# Patient Record
Sex: MALE | Race: WHITE
[De-identification: names, ages, dates, MRNs, and addresses within clinical notes are randomized per-mention and may not be internally consistent; named-entity substitution may affect disease eponyms.]

---

## 2019-06-27 ENCOUNTER — HOSPITAL ENCOUNTER (EMERGENCY)
Dept: HOSPITAL 91 - FTE | Age: 34
Discharge: HOME | End: 2019-06-27
Payer: MEDICAID

## 2019-06-27 ENCOUNTER — HOSPITAL ENCOUNTER (EMERGENCY)
Dept: HOSPITAL 10 - FTE | Age: 34
Discharge: HOME | End: 2019-06-27
Payer: MEDICAID

## 2019-06-27 VITALS
WEIGHT: 169.76 LBS | BODY MASS INDEX: 25.73 KG/M2 | HEIGHT: 68 IN | WEIGHT: 169.76 LBS | HEIGHT: 68 IN | BODY MASS INDEX: 25.73 KG/M2

## 2019-06-27 VITALS — SYSTOLIC BLOOD PRESSURE: 129 MMHG | DIASTOLIC BLOOD PRESSURE: 83 MMHG | HEART RATE: 74 BPM | RESPIRATION RATE: 20 BRPM

## 2019-06-27 DIAGNOSIS — K52.9: Primary | ICD-10-CM

## 2019-06-27 LAB
URINE BLOOD (DIP) POC: (no result)
URINE PH (DIP) POC: 6 (ref 5–8.5)
URINE TOTAL PROTEIN POC: (no result)

## 2019-06-27 PROCEDURE — 99283 EMERGENCY DEPT VISIT LOW MDM: CPT

## 2019-06-27 PROCEDURE — 81003 URINALYSIS AUTO W/O SCOPE: CPT

## 2019-06-27 RX ADMIN — ONDANSETRON 1 MG: 4 TABLET, ORALLY DISINTEGRATING ORAL at 10:32

## 2019-06-27 NOTE — ERD
ER Documentation


Chief Complaint


Chief Complaint





abdominal pain, vomitting and diarrhea





HPI


This is a 33-year-old male patient who presents to emergency room with complaint


of abdominal pain, vomiting and diarrhea since yesterday.  No hemoptysis, no 


hematochezia, no melena.  Subjective fever last night.  States vomiting and 


diarrhea comes at the same time, no dysuria however has some pain in his 


testicles without penile discharge.  States girlfriend has the same symptoms.  


No recent travel, no medical problems.  Patient is alert, awake, appropriate at 


time of evaluation.





ROS


All systems reviewed and are negative except as per history of present illness.





Medications


Home Meds


Active Scripts


Acetaminophen* (Tylophen*) 500 Mg Capsule, 2 CAP PO Q8H PRN for PAIN AND OR 


ELEVATED TEMP, #20 CAP


   Prov:CATHY FRANCIS NP         6/27/19


Ondansetron (Ondansetron Odt) 4 Mg Tab.rapdis, 4 MG PO Q6H PRN for NAUSEA AND/OR


VOMITING for 5 Days, #10 TAB


   Prov:CATHY FRANCIS NP         6/27/19





Allergies


Allergies:  


Coded Allergies:  


     No Known Allergy (Unverified , 6/27/19)





PMhx/Soc


Medical and Surgical Hx:  pt denies Medical Hx





FmHx


Family History:  No diabetes, No coronary disease, No other





Physical Exam


Vitals





Vital Signs


  Date      Temp  Pulse  Resp  B/P (MAP)   Pulse Ox  O2          O2 Flow    FiO2


Time                                                 Delivery    Rate


   6/27/19  98.1     74    20      129/83        94


     09:50                           (98)





Physical Exam


Const:   No acute distress


Head:   Atraumatic 


Eyes:    Normal Conjunctiva


ENT:    Normal External Ears, Nose and Mouth.  Pharynx pink, no lesions, no 


exudate, no petechiae.  Moist.


Neck:               Full range of motion. No meningismus.  Lymphadenopathy


Resp:   Clear to auscultation bilaterally, wheezing rales or rhonchi


Cardio:   Regular rate and rhythm, no murmurs


Abd:    Soft, non tender, non distended.  Hyperactive bowel sounds


Skin:   No petechiae or rashes, good turgor less than 2 seconds


Back:   No midline or flank tenderness, DVT


Ext:    No cyanosis, or edema


Neur:   Awake and alert


Psych:    Normal Mood and Affect


Results 24 hrs





Laboratory Tests


               Test
                                6/27/19
10:40


               Bedside Urine pH (LAB)                        6.0


               Bedside Urine Protein (LAB)                    1+


               Bedside Urine Glucose (UA)           Negative


               Bedside Urine Ketones (LAB)          Negative


               Bedside Urine Blood                            1+


               Bedside Urine Nitrite (LAB)          Negative


               Bedside Urine Leukocyte
Esterase (L  Negative 






Current Medications


 Medications
   Dose
          Sig/Danielito
       Start Time
   Status  Last


 (Trade)       Ordered        Route
 PRN     Stop Time              Admin
Dose


                              Reason                                Admin


 Ondansetron    4 mg           ONCE  STAT
    6/27/19       DC           6/27/19


HCl
  (Zofran                 ODT
           10:27
                       10:32



Odt)                                         6/27/19 10:28








Procedures/MDM


PROCEDURES/MDM





 


DIAGNOSTIC IMAGING:


Not indicated at this time.








LAB INTERPRETATION:


Urinalysis: Negative leukocyte esterase, negative nitrites





-Medications:  


Zofran, Tylenol


Patient tolerated medication well with no adverse reactions. Patient reported 


improvement in nausea.








MDM:


This is a 33-year-old male patient who presents emergency room with signs and 


symptoms of a infectious gastroenteritis.  Girlfriend has same symptoms.  


Patient is nontoxic appearing.  Patient was given Zofran in the ED followed by a


p.o. challenge.  Patient without vomiting during ED course.  No increase in 


pain, no episode of diarrhea while in the ED.  Patient states he feels 


comfortable with discharge plan of using Zofran at home with attempt to 


rehydration.  Patient verbalizes understanding of signs and symptoms of 


worsening of condition and when to return to the emergency room.





This patient has gastroenteritis. Without culture results, a preliminary 


diagnosis of undifferentiated (viral. bacterial or other type) gastroenteritis 


is made. Although this case is most consistent with a self limiting form of 


gastroenteritis, no evaluation can entirely exclude other, more serious causes. 


The usual precautions and warning signs were discussed. The family was warned to


return immediately for worsening symptoms or any concerns. They were advised to 


seek immediate follow-up with the PMD if the illness does not follow the usual 


course of gastroenteritis as discussed.


The patient looked well during ED observation. The vomiting and diarrhea were 


managed in the usual manner with good results. Hydration status was addressed. 


We verified the patient's ability tolerate PO fluids. Oral rehydration therapy 


instructions and dietary recommendations were provided. 


Use of antibiotics in undifferentiated gastroenteritis can lead to problems with


certain bacterial etiologies. Since bacterial gastroenteritis is usually self 


limiting and requires only supportive care, antibiotics were not used to reduce 


the risk of prolonged carrier state, hemolytic-uremic syndrome and C. diff 


colitis.











DISPOSITION and PLAN: 


RX: Zofran, Tylenol


The patient has been discharge home to follow-up with community physician.





Departure


Diagnosis:  


   Primary Impression:  


   Gastroenteritis


   Additional Impression:  


   Vomiting and diarrhea


Condition:  Stable











CATHY FRANCIS NP              Jun 27, 2019 10:37